# Patient Record
Sex: FEMALE | Race: WHITE
[De-identification: names, ages, dates, MRNs, and addresses within clinical notes are randomized per-mention and may not be internally consistent; named-entity substitution may affect disease eponyms.]

---

## 2018-09-12 NOTE — PCM.PREANE
Preanesthetic Assessment





- Anesthesia/Transfusion/Family Hx


Anesthesia History: Prior Anesthesia Without Reaction


Family History of Anesthesia Reaction: No


Transfusion History: Prior Transfusion Without Reaction


Intubation History: Unknown





- Review of Systems


General: No Symptoms


Pulmonary: No Symptoms


Cardiovascular: No Symptoms


Gastrointestinal: No Symptoms


Neurological: No Symptoms


Other: Reports: None





- Physical Assessment


Height: 1.7 m


Weight: 77.111 kg


ASA Class: 2


Mental Status: Alert & Oriented x3


Airway Class: Mallampati = 3


Dentition: Reports: Normal Dentition, Crown(s) (x2 upper front)


Thyro-Mental Finger Breadths: 2


Mouth Opening Finger Breadths: 2


ROM/Head Extension: Full


Lungs: Clear to Auscultation, Normal Respiratory Effort


Cardiovascular: Regular Rate, Regular Rhythm





- Allergies


Allergies/Adverse Reactions: 


 Allergies











Allergy/AdvReac Type Severity Reaction Status Date / Time


 


No Known Allergies Allergy   Verified 09/10/18 15:25














- Blood


Blood Available: No





- Anesthesia Plan


Pre-Op Medication Ordered: None





- Acknowledgements


Anesthesia Type Planned: General Anesthesia


Pt an Appropriate Candidate for the Planned Anesthesia: Yes


Alternatives and Risks of Anesthesia Discussed w Pt/Guardian: Yes


Pt/Guardian Understands and Agrees with Anesthesia Plan: Yes





PreAnesthesia Questionnaire


HEENT History: Reports: Other (See Below)


Other HEENT History: wears glasses


Cardiovascular History: Reports: Other (See Below)


Other Cardiovascular History: states her blood pressure fluctuates


Gastrointestinal History: Reports: None


Genitourinary History: Reports: Renal Calculus, Other (See Below)


Other Genitourinary History: states had a ruptured cyst outside of her left 

kidney


OB/GYN History: Reports: Pregnancy


Musculoskeletal History: Reports: Fracture


Other Musculoskeletal History: hx fx nose and rt wrist, presently left humerus 

fx.


Hematologic History: Reports: Blood Transfusion(s), Other (See Below)


Other Hematologic History: had transfusion after her ruptured cyst from her 

left kidney





- Past Surgical History


Head Surgeries/Procedures: Reports: None


HEENT Surgical History: Reports: Naso-Sinus Surgery, Tonsillectomy


Cardiovascular Surgical History: Reports: Varicose, Other (See Below)


GI Surgical History: Reports: Bariatric Procedure, Cholecystectomy


Other GI Surgeries/Procedures: hx gastric bypass


Female  Surgical History: Reports: Tubal Ligation





- SUBSTANCE USE


Smoking Status *Q: Never Smoker


Recreational Drug Use History: No





- HOME MEDS


Home Medications: 


 Home Meds





Naproxen Sodium [Aleve] 1 tab PO ASDIRECTED PRN 09/10/18 [History]











- CURRENT (IN HOUSE) MEDS


Current Meds: 





 Current Medications





Cefazolin Sodium/Dextrose 2 gm (/ Premix)  50 mls @ 50 mls/hr IV ONETIME BRANDON


Lactated Ringer's (Ringers, Lactated)  1,000 mls @ 125 mls/hr IV ASDIRECTED BRANDON





Discontinued Medications





Fentanyl (Sublimaze) Confirm Administered Dose 250 mcg .ROUTE .STK-MED ONE


   Stop: 09/12/18 13:57


Glycopyrrolate (Robinul) Confirm Administered Dose 0.4 mg .ROUTE .STK-MED ONE


   Stop: 09/12/18 13:57


Lidocaine (Xylocaine-Mpf 2%) Confirm Administered Dose 5 ml .ROUTE .STK-MED ONE


   Stop: 09/12/18 13:57


Midazolam HCl (Versed 1 Mg/Ml) Confirm Administered Dose 2 mg .ROUTE .STK-MED 

ONE


   Stop: 09/12/18 13:57


Neostigmine Methylsulfate (Neostigmine) Confirm Administered Dose 5 mg .ROUTE 

.STK-MED ONE


   Stop: 09/12/18 13:57


Ondansetron HCl (Zofran) Confirm Administered Dose 4 mg .ROUTE .STK-MED ONE


   Stop: 09/12/18 13:57


Propofol (Diprivan  20 Ml) Confirm Administered Dose 200 mg .ROUTE .STK-MED ONE


   Stop: 09/12/18 13:57


Rocuronium Bromide (Zemuron) Confirm Administered Dose 100 mg .ROUTE .STK-MED 

ONE


   Stop: 09/12/18 13:57


Tranexamic Acid (Cyklokapron)  2,000 mg IV ONETIME ONE


   Stop: 09/12/18 13:01

## 2018-09-12 NOTE — PCM.OPNOTE
- General Post-Op/Procedure Note


Date of Surgery/Procedure: 09/12/18


Operative Procedure(s): ORIF left 3 part proximal humerus fracture


Findings: 





comminution with greater tuberosity displacement


Pre Op Diagnosis: 3 part left proximal humerus fracture


Post-Op Diagnosis: same


Anesthesia Technique: General ET Tube


Primary Surgeon: Carlos GLOVER Mai


Assistant: Tamie Ward in mLs: 20


Complications: none


Condition: Good

## 2018-09-13 NOTE — CR
EXAMINATION: Left shoulder

 

HISTORY: ORIF

 

COMPARISON: 9/10/2018

 

TECHNIQUE: 3 views

 

FINDINGS/IMPRESSION: Operative control films demonstrate screw and plate fixation of a proximal left 
humerus fracture. Position and alignment appear near anatomic.

## 2018-09-13 NOTE — OR
SURGEON:

Carlos Mallory MD

 

DATE OF PROCEDURE:  09/12/2018

 

ASSISTANT:

Tamie Ward PA-C

 

PREOPERATIVE DIAGNOSIS:

Three-part left proximal humerus fracture.

 

POSTOPERATIVE DIAGNOSIS:

Three-part left proximal humerus fracture.

 

OPERATION PERFORMED:

Open reduction and internal fixation of left three-part proximal humerus

fracture.

 

ANESTHESIA:

General with ET tube.

 

COMPLICATIONS:

None.

 

ESTIMATED BLOOD LOSS:

20 mL.

 

SPECIMENS:

None.

 

IMPLANTS:

Bertha 3-hole left proximal humerus plate, 2 distal 3.5 nonlocking screws and

one 3.5 locking screw, 6 proximal locking screws, and 4 FiberWires in the

tuberosity.

 

INDICATIONS:

The patient is a 63-year-old female who 2 weeks ago suffered a proximal humerus

fracture with displacement.  Discussed with her, due to the large amount of

greater tuberosity displacement, the risks, benefits, and complications of open

reduction and internal fixation, she wished to proceed.  These include malunion,

nonunion, infection, chronic pain, and need for postoperative protocol, and she

wished to proceed.

 

DESCRIPTION OF OPERATION:

The patient was seen in preop area.  Operative extremity was marked with the

patient.  She was transferred to the operating room.  General anesthesia was

induced.  Endotracheal tube was placed.  She received preop antibiotics with

Ancef.  She was placed in slight beach-chair position.  Left upper extremity was

prepped and draped in sterile fashion using alcohol followed by ChloraPrep.  A

formal time-out was taken identifying correct patient, procedure, and extremity.

A 10 cm incision in the deltopectoral interval starting just inferior to the AC

joint and lateral to the coracoid was made bluntly down the arm.  Dissection was

carried down to the subcutaneous tissues.  Hemostasis was obtained.  The

interval between the deltoid and pectoralis major was found and cephalic vein,

and this was pulled laterally, and then a blunt dissection underneath was done

breaking up the bursal tissue, and then a retractor was placed exposing the

fracture.  There was essentially a nondisplaced fracture at the surgical neck

and was moved as a unit.  The tuberosity was displaced about over 1 cm.  Two

FiberWire sutures were placed into the tuberosity posterosuperiorly and then was

able to reduce it down.  Fracture started about a 5 mm posterior to the

bicipital groove.  It was able to be reduced anatomically and then held with a K-

wire.  Then, the suture was able to be passed through laterally to secure it,

and then a 3-hole proximal humerus plate was decided upon and was placed in the

anatomic position just posterior to the bicipital groove down about 7 mm beneath

the top of the greater tuberosity.  This was held in place with a K-wire, and

the distal screw was placed.  This confirmed placement under fluoroscopy, and

then following this, 1 more screw was placed distally bicortically, and then 6

proximal locking screws were placed in the posterosuperior aspect of it, all

unicortical, drilling only in slightly, and then pushing the drill bit in and

then measuring.  Another distal locking screw was placed.  Final x-rays

confirmed no penetration of any of the screws into the joint and near-anatomic

reduction with slight shortening with anatomic reduction of the greater

tuberosity.  The 2 sutures that were in the greater tuberosity were then passed

through the plate and secured down holding it in a very good position.  Then, 2

more FiberWire sutures were placed in Maksim-Dillon fashion noting that the

previous ones were also placed in Maksim-Dillon fashion into the rotator cuff and

through the plate holding the greater tuberosity and rotator cuff down to the

plate.  The wound was then thoroughly irrigated.  The subcutaneous tissues were

closed with 2-0 Stratafix and skin with running 4-0 Monocryl with Steri-Strips.

Xeroform and sterile dressing applied.  The patient was extubated in the

operating room and transferred to the recovery room in stable condition.  Sponge

and needle counts were correct at the end of the case.  There were no

complications.

 

PLAN:

The patient will be kept nonweightbearing in a sling.

 

 

BRITTANIE ZAVALA

DD:  09/12/2018 16:36:55

DT:  09/13/2018 03:41:39

Job #:  [id number]

## 2022-08-22 ENCOUNTER — HOSPITAL ENCOUNTER (OUTPATIENT)
Dept: HOSPITAL 56 - MW.SDS | Age: 67
Discharge: HOME | End: 2022-08-22
Attending: SURGERY
Payer: MEDICARE

## 2022-08-22 DIAGNOSIS — Z79.2: ICD-10-CM

## 2022-08-22 DIAGNOSIS — Z90.49: ICD-10-CM

## 2022-08-22 DIAGNOSIS — Z87.81: ICD-10-CM

## 2022-08-22 DIAGNOSIS — Z98.890: ICD-10-CM

## 2022-08-22 DIAGNOSIS — D50.9: Primary | ICD-10-CM

## 2022-08-22 DIAGNOSIS — Z79.899: ICD-10-CM

## 2022-08-22 DIAGNOSIS — Z98.84: ICD-10-CM

## 2022-08-22 DIAGNOSIS — I82.409: ICD-10-CM

## 2022-08-22 DIAGNOSIS — K29.50: ICD-10-CM

## 2022-08-22 PROCEDURE — 45378 DIAGNOSTIC COLONOSCOPY: CPT

## 2022-08-22 PROCEDURE — 43239 EGD BIOPSY SINGLE/MULTIPLE: CPT
